# Patient Record
Sex: FEMALE | Race: WHITE
[De-identification: names, ages, dates, MRNs, and addresses within clinical notes are randomized per-mention and may not be internally consistent; named-entity substitution may affect disease eponyms.]

---

## 2017-07-17 ENCOUNTER — HOSPITAL ENCOUNTER (EMERGENCY)
Dept: HOSPITAL 5 - ED | Age: 27
Discharge: HOME | End: 2017-07-17
Payer: COMMERCIAL

## 2017-07-17 VITALS — SYSTOLIC BLOOD PRESSURE: 122 MMHG | DIASTOLIC BLOOD PRESSURE: 80 MMHG

## 2017-07-17 DIAGNOSIS — O20.0: Primary | ICD-10-CM

## 2017-07-17 LAB
ALBUMIN SERPL-MCNC: 4.5 G/DL (ref 3.9–5)
ALBUMIN/GLOB SERPL: 1.3 %
ALP SERPL-CCNC: 72 UNITS/L (ref 35–129)
ALT SERPL-CCNC: 12 UNITS/L (ref 7–56)
ANION GAP SERPL CALC-SCNC: 17 MMOL/L
BASOPHILS NFR BLD AUTO: 0.7 % (ref 0–1.8)
BILIRUB SERPL-MCNC: 0.2 MG/DL (ref 0.1–1.2)
BILIRUB UR QL STRIP: (no result)
BLOOD UR QL VISUAL: (no result)
BUN SERPL-MCNC: 9 MG/DL (ref 7–17)
BUN/CREAT SERPL: 18 %
CALCIUM SERPL-MCNC: 9.6 MG/DL (ref 8.4–10.2)
CHLORIDE SERPL-SCNC: 101.8 MMOL/L (ref 98–107)
CO2 SERPL-SCNC: 27 MMOL/L (ref 22–30)
EOSINOPHIL NFR BLD AUTO: 2.4 % (ref 0–4.3)
GLUCOSE SERPL-MCNC: 89 MG/DL (ref 65–100)
HCT VFR BLD CALC: 39.1 % (ref 30.3–42.9)
HGB BLD-MCNC: 13.3 GM/DL (ref 10.1–14.3)
KETONES UR STRIP-MCNC: (no result) MG/DL
LEUKOCYTE ESTERASE UR QL STRIP: (no result)
MCH RBC QN AUTO: 29 PG (ref 28–32)
MCHC RBC AUTO-ENTMCNC: 34 % (ref 30–34)
MCV RBC AUTO: 84 FL (ref 79–97)
NITRITE UR QL STRIP: (no result)
PH UR STRIP: 7 [PH] (ref 5–7)
PLATELET # BLD: 371 K/MM3 (ref 140–440)
POTASSIUM SERPL-SCNC: 4.7 MMOL/L (ref 3.6–5)
PROT SERPL-MCNC: 7.9 G/DL (ref 6.3–8.2)
PROT UR STRIP-MCNC: (no result) MG/DL
RBC # BLD AUTO: 4.66 M/MM3 (ref 3.65–5.03)
RBC #/AREA URNS HPF: 45 /HPF (ref 0–6)
SODIUM SERPL-SCNC: 141 MMOL/L (ref 137–145)
UROBILINOGEN UR-MCNC: < 2 MG/DL (ref ?–2)
WBC # BLD AUTO: 8.3 K/MM3 (ref 4.5–11)
WBC #/AREA URNS HPF: 2 /HPF (ref 0–6)

## 2017-07-17 PROCEDURE — 84702 CHORIONIC GONADOTROPIN TEST: CPT

## 2017-07-17 PROCEDURE — 85025 COMPLETE CBC W/AUTO DIFF WBC: CPT

## 2017-07-17 PROCEDURE — 80053 COMPREHEN METABOLIC PANEL: CPT

## 2017-07-17 PROCEDURE — 76801 OB US < 14 WKS SINGLE FETUS: CPT

## 2017-07-17 PROCEDURE — 36415 COLL VENOUS BLD VENIPUNCTURE: CPT

## 2017-07-17 PROCEDURE — 81001 URINALYSIS AUTO W/SCOPE: CPT

## 2017-07-17 PROCEDURE — 76817 TRANSVAGINAL US OBSTETRIC: CPT

## 2017-07-17 NOTE — EMERGENCY DEPARTMENT REPORT
ED Pregnancy HPI





- General


Chief complaint: Vaginal Bleeding


Stated complaint: 10 WKS PREGNANT,BLOOD IN URINE


Time Seen by Provider: 17 10:07


Source: patient


Mode of arrival: Ambulatory


Limitations: No Limitations





- History of Present Illness


MD Complaint: abdominal pain, vaginal bleeding


-: Gradual


Location: pelvis, abdomen


Radiation: none


Severity: mild


Severity scale (0 -10): 2


Quality: cramping


Consistency: intermittent


Improves with: none


Worsens with: none


Associated symptoms: vaginal bleeding, abdominal pain.  denies: nausea/vomiting

, vaginal discharge, dysuria, headache, vision changes, malaise, dysparuenia, 

rash, seizure, shortness of breath, syncope


Vaginal bleeding: light


Pregnant:: Yes


Number of weeks pregnant: 2


OB History - Current Pregnancy: no complications


OB History - Previous Pregnancies: no complications


Pre- care: none





- Related Data


: 2


Para: 1


 Home Medications











 Medication  Instructions  Recorded  Confirmed  Last Taken


 


Pnv with Ca,No.72/Iron/FA 1 tab PO DAILY 01/19/15 02/07/15 01/19/15 10:00





[Prenatal Plus Tablet]    1








 Previous Rx's











 Medication  Instructions  Recorded  Last Taken  Type


 


NIFEdipine*For Tocolysis only* 10 mg PO Q6H PRN 30 Days 01/20/15 Unknown Rx





[PROCARDIA*For Tocolysis only*]    











 Allergies











Allergy/AdvReac Type Severity Reaction Status Date / Time


 


No Known Allergies Allergy   Verified 17 10:07














ED Review of Systems


ROS: 


Stated complaint: 10 WKS PREGNANT,BLOOD IN URINE


Other details as noted in HPI





Comment: All other systems reviewed and negative





ED Past Medical Hx





- Past Medical History


Hx Hypertension: No


Hx Congestive Heart Failure: No


Hx Diabetes: No


Hx Deep Vein Thrombosis: No


Hx Renal Disease: No


Hx Sickle Cell Disease: No


Hx Seizures: No


Hx Asthma: No


Hx COPD: No


Hx HIV: No





- Social History


Smoking Status: Never Smoker


Substance Use Type: None





- Medications


Home Medications: 


 Home Medications











 Medication  Instructions  Recorded  Confirmed  Last Taken  Type


 


Pnv with Ca,No.72/Iron/FA 1 tab PO DAILY 01/19/15 02/07/15 01/19/15 10:00 

History





[Prenatal Plus Tablet]    1 


 


NIFEdipine*For Tocolysis only* 10 mg PO Q6H PRN 30 Days 01/20/15 02/07/15 

Unknown Rx





[PROCARDIA*For Tocolysis only*]     














ED Physical Exam





- General


Limitations: No Limitations


General appearance: alert, in no apparent distress





- Head


Head exam: Present: atraumatic, normocephalic





- Eye


Eye exam: Present: normal appearance





- ENT


ENT exam: Present: mucous membranes moist





- Neck


Neck exam: Present: normal inspection





- Respiratory


Respiratory exam: Present: normal lung sounds bilaterally.  Absent: respiratory 

distress





- Cardiovascular


Cardiovascular Exam: Present: regular rate, normal rhythm.  Absent: systolic 

murmur, diastolic murmur, rubs, gallop





- GI/Abdominal


GI/Abdominal exam: Present: soft, normal bowel sounds





- Extremities Exam


Extremities exam: Present: normal inspection





- Back Exam


Back exam: Present: normal inspection





- Neurological Exam


Neurological exam: Present: alert, oriented X3





- Psychiatric


Psychiatric exam: Present: normal affect, normal mood





- Skin


Skin exam: Present: warm, dry, intact, normal color.  Absent: rash





ED Course





 Vital Signs











  17





  10:08


 


Temperature 98.2 F


 


Pulse Rate 73


 


Respiratory 20





Rate 


 


Blood Pressure 122/80


 


O2 Sat by Pulse 100





Oximetry 














ED Medical Decision Making





- Lab Data


Result diagrams: 


 17 10:15





 17 10:15





- Radiology Data


Radiology results: report reviewed, image reviewed





- Medical Decision Making





patient with minor vaginal bleeding and abdominal cramping, labs negative , HCG 

at 44 , US is negative.





Will need 48 h repeat hcg that she will get with her obgyn , ( already has 

appointment ),





will dc and follow up.


Critical care attestation.: 


If time is entered above; I have spent that time in minutes in the direct care 

of this critically ill patient, excluding procedure time.








ED Disposition


Clinical Impression: 


 Threatened , Miscarried within last 12 months





Disposition: DC-01 TO HOME OR SELFCARE


Is pt being admited?: No


Does the pt Need Aspirin: No


Condition: Good


Additional Instructions: 


You need to follow up with your OB on Wednesday as schedule, for a repeat on 

the hcg level, 


Referrals: 


PRIMARY CARE,MD [Primary Care Provider] - 3-5 Days


Time of Disposition: 14:13

## 2017-07-17 NOTE — ULTRASOUND REPORT
ULTRASOUND OB LESS THAN 14 WEEKS - TRANSABDOMINAL AND TRANSVAGINAL



INDICATION: Pregnant, bleeding. Serum beta-hCG of 44.46 units.



COMPARISON: None similar during this gestation.



FINDINGS: Transabdominal and transvaginal pregnant pelvic sonography 

performed in this patient with LMP of 5/1/17 and estimated menstrual 

age of 11 weeks and 0 days and EDC of 2/5/2018. 



Uterus approximately 7.7 x 3.2 x 4.2 cm and appears retroverted 

endovaginally with endometrial thickness of approximately 0.5 cm 

towards the fundus.  No significant pelvic free fluid.



Right ovary is 2.4 x 1.6 x 1.7 cm while the left ovary is 3 x 1.2 x 2.6 

cm.



CONCLUSION:



1. No sonographic evidence of a viable intrauterine gestation at this 

time, as described.



2. Both ovaries identified, as above.



Please also correlate clinically for accuracy of the LMP and with 

followup serum beta-hCG values, as warranted.



Thank you for the opportunity to participate in this patient's care.

## 2017-07-17 NOTE — EMERGENCY DEPARTMENT REPORT
Chief Complaint: Vaginal Bleeding


Stated Complaint: 10 WKS PREGNANT,BLOOD IN URINE


Time Seen by Provider: 07/17/17 10:07





- HPI


History of Present Illness: 





PT states she is 11 weeks pregnant.  PT states her lmp was 5-1-17.  PT states 

when she missed her cycle, she took a home pregnancy test and it was negative.  

PT states last week, she repeated the test and it was positive.  PT states she 

is having vaginal bleeding now. PT states the bleeding started on Thursday.  PT 

states it is like a normal period.  PT states she has not seen her OB/ GYN yet.

  





- ROS


Review of Systems: 





+ lower abd cramps


- hematuria


- dysuria


+ vaginal bleeding 





- Exam


Physical Exam: 





pt looks well, non toxic.  


abd is soft and not tender. 


MSE screening note: 


Focused history and physical exam performed.


Due to findings the following was ordered:





labs, us











ED Disposition for MSE


Condition: Stable

## 2018-07-11 ENCOUNTER — HOSPITAL ENCOUNTER (EMERGENCY)
Dept: HOSPITAL 5 - ED | Age: 28
Discharge: HOME | End: 2018-07-11
Payer: COMMERCIAL

## 2018-07-11 VITALS — SYSTOLIC BLOOD PRESSURE: 123 MMHG | DIASTOLIC BLOOD PRESSURE: 61 MMHG

## 2018-07-11 DIAGNOSIS — J03.90: Primary | ICD-10-CM

## 2018-07-11 LAB
ANISOCYTOSIS BLD QL SMEAR: (no result)
BAND NEUTROPHILS # (MANUAL): 0.4 K/MM3
BUN SERPL-MCNC: 10 MG/DL (ref 7–17)
BUN/CREAT SERPL: 20 %
CALCIUM SERPL-MCNC: 8.4 MG/DL (ref 8.4–10.2)
HCT VFR BLD CALC: 38 % (ref 30.3–42.9)
HEMOLYSIS INDEX: 3
HGB BLD-MCNC: 12.8 GM/DL (ref 10.1–14.3)
MCH RBC QN AUTO: 28 PG (ref 28–32)
MCHC RBC AUTO-ENTMCNC: 34 % (ref 30–34)
MCV RBC AUTO: 84 FL (ref 79–97)
MYELOCYTES # (MANUAL): 0 K/MM3
PLATELET # BLD: 324 K/MM3 (ref 140–440)
PROMYELOCYTES # (MANUAL): 0 K/MM3
RBC # BLD AUTO: 4.5 M/MM3 (ref 3.65–5.03)
TOTAL CELLS COUNTED BLD: 100

## 2018-07-11 PROCEDURE — 80048 BASIC METABOLIC PNL TOTAL CA: CPT

## 2018-07-11 PROCEDURE — 85007 BL SMEAR W/DIFF WBC COUNT: CPT

## 2018-07-11 PROCEDURE — 85025 COMPLETE CBC W/AUTO DIFF WBC: CPT

## 2018-07-11 PROCEDURE — 84703 CHORIONIC GONADOTROPIN ASSAY: CPT

## 2018-07-11 PROCEDURE — 99283 EMERGENCY DEPT VISIT LOW MDM: CPT

## 2018-07-11 PROCEDURE — 96372 THER/PROPH/DIAG INJ SC/IM: CPT

## 2018-07-11 PROCEDURE — 36415 COLL VENOUS BLD VENIPUNCTURE: CPT

## 2018-07-11 NOTE — EMERGENCY DEPARTMENT REPORT
ED ENT HPI





- General


Chief complaint: Fever


Stated complaint: FEVER, STREP THROAT


Time Seen by Provider: 07/11/18 11:38


Source: patient


Mode of arrival: Ambulatory


Limitations: No Limitations





- History of Present Illness


Initial comments: 





This is a 28-year-old female nontoxic, well nourished in appearance, no acute 

signs of distress presents to the ED with c/o of sore throat. Patient that she 

was in [facility] urgent care and was diagnosed with strep throat and sent 

over to emergency room for fever.  Patient describes sore throat as swallowing 

razer blades.  Patient denies any headache, stiff neck, nausea, vomiting, chest 

pain, shortness of breath, numbness or tingling. Patient denies any drooling or 

hoarseness.  Patient denies any allergies or significant past medical history.


MD complaint: sore throat


-: days(s) (2)


Location: throat


Severity: mild


Severity scale (0 -10): 8


Quality: aching


Consistency: constant


Improves with: none


Worsens with: swallowing


Associated Symptoms: pain with swallowing, sore throat.  denies: fever, cough, 

gum swelling, toothache, tinnitus, hearing loss, discharge from ear, rhinorrhea





- Related Data


 Home Medications











 Medication  Instructions  Recorded  Confirmed  Last Taken


 


Pnv with Ca,No.72/Iron/FA 1 tab PO DAILY 01/19/15 02/07/15 01/19/15 10:00





[Prenatal Plus Tablet]    1








 Previous Rx's











 Medication  Instructions  Recorded  Last Taken  Type


 


NIFEdipine*For Tocolysis only* 10 mg PO Q6H PRN 30 Days  capsule 01/20/15 

Unknown Rx





[PROCARDIA*For Tocolysis only*]    


 


Ibuprofen [Motrin] 600 mg PO Q8H PRN #30 tablet 07/11/18 Unknown Rx


 


Nystas/Diphen/Xyl Visc/Mylanta 15 ml MM Q6H PRN 5 Days  ml 07/11/18 Unknown Rx





[Magic Mouthwash]    











 Allergies











Allergy/AdvReac Type Severity Reaction Status Date / Time


 


No Known Allergies Allergy   Verified 07/17/17 10:07














ED Dental HPI





- General


Chief complaint: Fever


Stated complaint: FEVER, STREP THROAT


Time Seen by Provider: 07/11/18 11:38


Source: patient


Mode of arrival: Ambulatory


Limitations: No Limitations





- Related Data


 Home Medications











 Medication  Instructions  Recorded  Confirmed  Last Taken


 


Pnv with Ca,No.72/Iron/FA 1 tab PO DAILY 01/19/15 02/07/15 01/19/15 10:00





[Prenatal Plus Tablet]    1








 Previous Rx's











 Medication  Instructions  Recorded  Last Taken  Type


 


NIFEdipine*For Tocolysis only* 10 mg PO Q6H PRN 30 Days  capsule 01/20/15 

Unknown Rx





[PROCARDIA*For Tocolysis only*]    


 


Ibuprofen [Motrin] 600 mg PO Q8H PRN #30 tablet 07/11/18 Unknown Rx


 


Nystas/Diphen/Xyl Visc/Mylanta 15 ml MM Q6H PRN 5 Days  ml 07/11/18 Unknown Rx





[Magic Mouthwash]    











 Allergies











Allergy/AdvReac Type Severity Reaction Status Date / Time


 


No Known Allergies Allergy   Verified 07/17/17 10:07














ED Review of Systems


ROS: 


Stated complaint: FEVER, STREP THROAT


Other details as noted in HPI





Constitutional: chills, fever


Eyes: denies: eye pain, eye discharge, vision change


ENT: throat pain.  denies: ear pain


Respiratory: denies: cough, shortness of breath, wheezing


Cardiovascular: denies: chest pain, palpitations


Endocrine: no symptoms reported


Gastrointestinal: denies: abdominal pain, nausea, diarrhea


Genitourinary: denies: urgency, dysuria, discharge


Musculoskeletal: denies: back pain, joint swelling, arthralgia


Skin: denies: rash, lesions


Neurological: denies: headache, weakness, paresthesias


Psychiatric: denies: anxiety, depression


Hematological/Lymphatic: denies: easy bleeding, easy bruising





ED Past Medical Hx





- Past Medical History


Previous Medical History?: Yes


Hx Hypertension: No


Hx Congestive Heart Failure: No


Hx Diabetes: No


Hx Deep Vein Thrombosis: No


Hx Renal Disease: No


Hx Sickle Cell Disease: No


Hx Seizures: No


Hx Asthma: No


Hx COPD: No


Hx HIV: No





- Surgical History


Past Surgical History?: No





- Social History


Smoking Status: Never Smoker


Substance Use Type: Alcohol, Prescribed





- Medications


Home Medications: 


 Home Medications











 Medication  Instructions  Recorded  Confirmed  Last Taken  Type


 


Pnv with Ca,No.72/Iron/FA 1 tab PO DAILY 01/19/15 02/07/15 01/19/15 10:00 

History





[Prenatal Plus Tablet]    1 


 


NIFEdipine*For Tocolysis only* 10 mg PO Q6H PRN 30 Days  capsule 01/20/15 02/07/

15 Unknown Rx





[PROCARDIA*For Tocolysis only*]     


 


Ibuprofen [Motrin] 600 mg PO Q8H PRN #30 tablet 07/11/18  Unknown Rx


 


Nystas/Diphen/Xyl Visc/Mylanta 15 ml MM Q6H PRN 5 Days  ml 07/11/18  Unknown Rx





[Magic Mouthwash]     














ED Physical Exam





- General


Limitations: No Limitations


General appearance: alert, in no apparent distress





- Head


Head exam: Present: atraumatic, normocephalic





- Eye


Eye exam: Present: normal appearance, PERRL, EOMI


Pupils: Present: normal accommodation





- ENT


ENT exam: Present: mucous membranes moist, TM's normal bilaterally, normal 

external ear exam





- Expanded ENT Exam


  ** Expanded


Ear exam: Present: normal external inspection


Mouth exam: Present: normal external inspection, tongue normal.  Absent: 

drooling, trismus, muffled voice, tongue elevation, laceration


Teeth exam: Present: normal inspection


Throat exam: Positive: tonsillar erythema, tonsillomegaly, tonsillar exudate, 

other (Uvula midline. No abscess or swelling noted. no drooling. No hoarseness.)

.  Negative: R peritonsillar mass, L peritonsillar mass





- Neck


Neck exam: Present: normal inspection, full ROM, lymphadenopathy (bilateral 

tonsillar).  Absent: tenderness, meningismus





- Respiratory


Respiratory exam: Present: normal lung sounds bilaterally.  Absent: respiratory 

distress, wheezes, rales, rhonchi, stridor, chest wall tenderness, accessory 

muscle use, decreased breath sounds, prolonged expiratory





- Cardiovascular


Cardiovascular Exam: Present: regular rate, normal rhythm, tachycardia, normal 

heart sounds.  Absent: bradycardia, irregular rhythm, systolic murmur, 

diastolic murmur, rubs, gallop





- GI/Abdominal


GI/Abdominal exam: Present: soft, normal bowel sounds





- Extremities Exam


Extremities exam: Present: normal inspection, full ROM, normal capillary refill





- Back Exam


Back exam: Present: normal inspection, full ROM





- Neurological Exam


Neurological exam: Present: alert, oriented X3, normal gait





- Psychiatric


Psychiatric exam: Present: normal affect, normal mood





- Skin


Skin exam: Present: warm, dry, intact, normal color.  Absent: rash





ED Course


 Vital Signs











  07/11/18 07/11/18 07/11/18





  11:29 11:57 12:15


 


Temperature 102.7 F H  


 


Pulse Rate 104 H  


 


Respiratory 18 18 18





Rate   


 


Blood Pressure 128/79  


 


Blood Pressure   





[Left]   


 


O2 Sat by Pulse 95  





Oximetry   














  07/11/18 07/11/18





  13:21 13:27


 


Temperature 98.3 F 


 


Pulse Rate 89 88


 


Respiratory 16 





Rate  


 


Blood Pressure  


 


Blood Pressure 95/59 123/61





[Left]  


 


O2 Sat by Pulse 97 





Oximetry  














- Reevaluation(s)


Reevaluation #1: 





07/11/18 13:20


Patient is speaking in full sentences with no signs of distress noted.





- Consultations


Consultation #1: 





07/11/18 13:37


Patient has been consulted with Dr. Collins about patient history, physical exam, 

and labs and examined and screened patient and agrees to ED plan of care and 

discharge plan of care. 





ED Medical Decision Making





- Lab Data


Result diagrams: 


 07/11/18 13:08








- Medical Decision Making





This is a 28-year-old female that presents with tonsillitis.  Patient is stable 

was examined by me and NADEEN Khan.  There is no drooling.  No tonsillar 

abscess noted.  Uvula is midline.  Patient received Bicillin IM in the ED.  

Labs obtained and unremarkable.  Patient also received 1L of normal saline.  

Vital signs are stable.  Patient is not febrile and normal heart rate.  Patient 

is drinking in the ED.  Patient was instructed to Follow-up with a primary care 

doctor in 3-5 days or if symptoms worsen and continue return to emergency room 

as soon as possible.  At time of discharge, the patient does not seem toxic or 

ill in appearance.  No acute signs of distress noted.  Patient agrees to 

discharge treatment plan of care.  No further questions noted by the patient.


Critical care attestation.: 


If time is entered above; I have spent that time in minutes in the direct care 

of this critically ill patient, excluding procedure time.








ED Disposition


Clinical Impression: 


 Tonsillitis with exudate





Disposition: DC-01 TO HOME OR SELFCARE


Is pt being admited?: No


Does the pt Need Aspirin: No


Condition: Stable


Instructions:  Tonsillitis (ED)


Additional Instructions: 


Follow-up with a primary care doctor in 3-5 days or if symptoms worsen and 

continue return to emergency room as soon as possible. 


Prescriptions: 


Ibuprofen [Motrin] 600 mg PO Q8H PRN #30 tablet


 PRN Reason: Pain


Nystas/Diphen/Xyl Visc/Mylanta [Magic Mouthwash] 15 ml MM Q6H PRN 5 Days  ml


 PRN Reason: Sore Throat


Referrals: 


PRIMARY CARE,MD [Primary Care Provider] - 3-5 Days


JAYESH GARDINER MD [Staff Physician] - 3-5 Days


WAQAS BARNEY MD [Staff Physician] - 3-5 Days


[facility] [Outside] - 3-5 Days


[facility] [Outside] - 3-5 Days


Forms:  Work/School Release Form(ED)

## 2020-06-06 ENCOUNTER — HOSPITAL ENCOUNTER (INPATIENT)
Dept: HOSPITAL 5 - TRG | Age: 30
LOS: 2 days | Discharge: HOME | End: 2020-06-08
Attending: OBSTETRICS & GYNECOLOGY | Admitting: OBSTETRICS & GYNECOLOGY
Payer: COMMERCIAL

## 2020-06-06 DIAGNOSIS — Z23: ICD-10-CM

## 2020-06-06 DIAGNOSIS — D62: ICD-10-CM

## 2020-06-06 DIAGNOSIS — Z3A.38: ICD-10-CM

## 2020-06-06 LAB
HCT VFR BLD CALC: 37.2 % (ref 30.3–42.9)
HGB BLD-MCNC: 12.6 GM/DL (ref 10.1–14.3)
MCHC RBC AUTO-ENTMCNC: 34 % (ref 30–34)
MCV RBC AUTO: 83 FL (ref 79–97)
PLATELET # BLD: 258 K/MM3 (ref 140–440)
RBC # BLD AUTO: 4.49 M/MM3 (ref 3.65–5.03)

## 2020-06-06 PROCEDURE — 76816 OB US FOLLOW-UP PER FETUS: CPT

## 2020-06-06 PROCEDURE — 36415 COLL VENOUS BLD VENIPUNCTURE: CPT

## 2020-06-06 PROCEDURE — 86901 BLOOD TYPING SEROLOGIC RH(D): CPT

## 2020-06-06 PROCEDURE — 85018 HEMOGLOBIN: CPT

## 2020-06-06 PROCEDURE — 85014 HEMATOCRIT: CPT

## 2020-06-06 PROCEDURE — 86850 RBC ANTIBODY SCREEN: CPT

## 2020-06-06 PROCEDURE — 88307 TISSUE EXAM BY PATHOLOGIST: CPT

## 2020-06-06 PROCEDURE — 85027 COMPLETE CBC AUTOMATED: CPT

## 2020-06-06 PROCEDURE — 0DQR0ZZ REPAIR ANAL SPHINCTER, OPEN APPROACH: ICD-10-PCS | Performed by: OBSTETRICS & GYNECOLOGY

## 2020-06-06 PROCEDURE — 86900 BLOOD TYPING SEROLOGIC ABO: CPT

## 2020-06-06 PROCEDURE — 90715 TDAP VACCINE 7 YRS/> IM: CPT

## 2020-06-06 RX ADMIN — EPHEDRINE SULFATE PRN MG: 50 INJECTION INTRAVENOUS at 12:58

## 2020-06-06 RX ADMIN — EPHEDRINE SULFATE PRN MG: 50 INJECTION INTRAVENOUS at 09:57

## 2020-06-06 RX ADMIN — DOCUSATE SODIUM SCH MG: 100 CAPSULE, LIQUID FILLED ORAL at 22:29

## 2020-06-06 RX ADMIN — SODIUM CHLORIDE, SODIUM LACTATE, POTASSIUM CHLORIDE, AND CALCIUM CHLORIDE SCH MLS/HR: .6; .31; .03; .02 INJECTION, SOLUTION INTRAVENOUS at 10:26

## 2020-06-06 RX ADMIN — SODIUM CHLORIDE, SODIUM LACTATE, POTASSIUM CHLORIDE, AND CALCIUM CHLORIDE SCH MLS/HR: .6; .31; .03; .02 INJECTION, SOLUTION INTRAVENOUS at 06:30

## 2020-06-06 RX ADMIN — IBUPROFEN SCH MG: 600 TABLET, FILM COATED ORAL at 18:37

## 2020-06-07 LAB
HCT VFR BLD CALC: 30 % (ref 30.3–42.9)
HGB BLD-MCNC: 10.2 GM/DL (ref 10.1–14.3)

## 2020-06-07 PROCEDURE — 3E0234Z INTRODUCTION OF SERUM, TOXOID AND VACCINE INTO MUSCLE, PERCUTANEOUS APPROACH: ICD-10-PCS | Performed by: OBSTETRICS & GYNECOLOGY

## 2020-06-07 RX ADMIN — OXYCODONE AND ACETAMINOPHEN PRN TAB: 5; 325 TABLET ORAL at 18:32

## 2020-06-07 RX ADMIN — IBUPROFEN SCH MG: 600 TABLET, FILM COATED ORAL at 23:56

## 2020-06-07 RX ADMIN — IBUPROFEN SCH: 600 TABLET, FILM COATED ORAL at 08:18

## 2020-06-07 RX ADMIN — FERROUS SULFATE TAB 325 MG (65 MG ELEMENTAL FE) SCH MG: 325 (65 FE) TAB at 10:00

## 2020-06-07 RX ADMIN — DOCUSATE SODIUM SCH MG: 100 CAPSULE, LIQUID FILLED ORAL at 23:56

## 2020-06-07 RX ADMIN — OXYCODONE AND ACETAMINOPHEN PRN TAB: 5; 325 TABLET ORAL at 06:29

## 2020-06-07 RX ADMIN — OXYCODONE AND ACETAMINOPHEN PRN TAB: 5; 325 TABLET ORAL at 12:31

## 2020-06-07 RX ADMIN — DOCUSATE SODIUM SCH MG: 100 CAPSULE, LIQUID FILLED ORAL at 09:12

## 2020-06-07 RX ADMIN — IBUPROFEN SCH MG: 600 TABLET, FILM COATED ORAL at 13:59

## 2020-06-07 RX ADMIN — IBUPROFEN SCH MG: 600 TABLET, FILM COATED ORAL at 01:38

## 2020-06-08 VITALS — DIASTOLIC BLOOD PRESSURE: 79 MMHG | SYSTOLIC BLOOD PRESSURE: 122 MMHG

## 2020-06-08 RX ADMIN — FERROUS SULFATE TAB 325 MG (65 MG ELEMENTAL FE) SCH MG: 325 (65 FE) TAB at 10:35

## 2020-06-08 RX ADMIN — DOCUSATE SODIUM SCH MG: 100 CAPSULE, LIQUID FILLED ORAL at 10:35

## 2020-06-08 RX ADMIN — IBUPROFEN SCH MG: 600 TABLET, FILM COATED ORAL at 05:33

## 2020-06-08 RX ADMIN — OXYCODONE AND ACETAMINOPHEN PRN TAB: 5; 325 TABLET ORAL at 10:36
